# Patient Record
Sex: MALE | Race: BLACK OR AFRICAN AMERICAN | Employment: FULL TIME | URBAN - METROPOLITAN AREA
[De-identification: names, ages, dates, MRNs, and addresses within clinical notes are randomized per-mention and may not be internally consistent; named-entity substitution may affect disease eponyms.]

---

## 2022-03-10 ENCOUNTER — TELEPHONE (OUTPATIENT)
Dept: UROLOGY | Age: 37
End: 2022-03-10

## 2022-03-10 NOTE — TELEPHONE ENCOUNTER
[facility] FOR PT LETTING HIM KNOW WE DO NOT TAKE HIS [employer] INSURANCE AND HIS APPT WILL HAVE TO BE CANCELED.  WE CAN GIVE HIM [facility] NUMBER  [phone] IF HE WANTS TO SEE ANOTHER UROLOGIST

## 2022-03-14 NOTE — TELEPHONE ENCOUNTER
03/14 [facility] for pt to call office we need to cancel his appt because he has [employer] insurnace and we cannot take that

## 2022-03-15 NOTE — TELEPHONE ENCOUNTER
Was able to speak with patient and explain to him that do not take his insurance and I provided him with [facility] number.  NOTE CLOSED